# Patient Record
Sex: MALE | Race: OTHER | HISPANIC OR LATINO | URBAN - METROPOLITAN AREA
[De-identification: names, ages, dates, MRNs, and addresses within clinical notes are randomized per-mention and may not be internally consistent; named-entity substitution may affect disease eponyms.]

---

## 2020-05-25 ENCOUNTER — EMERGENCY (EMERGENCY)
Facility: HOSPITAL | Age: 35
LOS: 1 days | Discharge: HOME | End: 2020-05-25
Admitting: EMERGENCY MEDICINE
Payer: SELF-PAY

## 2020-05-25 VITALS
TEMPERATURE: 98 F | WEIGHT: 134.92 LBS | HEART RATE: 95 BPM | SYSTOLIC BLOOD PRESSURE: 113 MMHG | OXYGEN SATURATION: 96 % | DIASTOLIC BLOOD PRESSURE: 80 MMHG | RESPIRATION RATE: 18 BRPM

## 2020-05-25 PROCEDURE — 99284 EMERGENCY DEPT VISIT MOD MDM: CPT

## 2020-05-25 NOTE — ED ADULT NURSE NOTE - CAS EDN DISCHARGE ASSESSMENT
Awake/Alert and oriented to person, place and time/Symptoms improved/A,A&Ox3, OOB, ambulating with steady gait, speech is clear.

## 2020-05-25 NOTE — ED PROVIDER NOTE - CLINICAL SUMMARY MEDICAL DECISION MAKING FREE TEXT BOX
Patient BIB EMS for AMS admits to drinking denies fall, h/a. will observe in ED until clinical sobriety

## 2020-05-25 NOTE — ED ADULT TRIAGE NOTE - WEIGHT IN KG
Refill request received for lisinopril. Patient was last seen on 9/13/16 with an upcoming appointment on 3/14/17. Last filled on 1/26/16. Filled per protocol.    61.2

## 2020-05-25 NOTE — ED PROVIDER NOTE - PATIENT PORTAL LINK FT
You can access the FollowMyHealth Patient Portal offered by [facility] by registering at the following website: [email or web address]. By joining [facility]’s FollowMyHealth portal, you will also be able to view your health information using other applications (apps) compatible with our system.

## 2020-05-25 NOTE — ED PROVIDER NOTE - OBJECTIVE STATEMENT
Patient BIB EMS for AMS. Found by EMS sitting up and crying. patient [place] speaking only. denies fall. admits to drinking 4-6 drinks. states that he drinks 2twice a week. denies withdrawl sx, seizure, fall. h/a, nausea, vomiting. history otherwise limited due to clinical condition

## 2020-05-29 DIAGNOSIS — F10.129 ALCOHOL ABUSE WITH INTOXICATION, UNSPECIFIED: ICD-10-CM

## 2020-05-29 DIAGNOSIS — R41.82 ALTERED MENTAL STATUS, UNSPECIFIED: ICD-10-CM
